# Patient Record
Sex: MALE | Race: BLACK OR AFRICAN AMERICAN | NOT HISPANIC OR LATINO | Employment: UNEMPLOYED | URBAN - METROPOLITAN AREA
[De-identification: names, ages, dates, MRNs, and addresses within clinical notes are randomized per-mention and may not be internally consistent; named-entity substitution may affect disease eponyms.]

---

## 2023-12-16 ENCOUNTER — OFFICE VISIT (OUTPATIENT)
Dept: OTOLARYNGOLOGY | Facility: CLINIC | Age: 53
End: 2023-12-16
Payer: COMMERCIAL

## 2023-12-16 VITALS — WEIGHT: 215 LBS | BODY MASS INDEX: 31.84 KG/M2 | HEIGHT: 69 IN

## 2023-12-16 DIAGNOSIS — R49.0 CHRONIC HOARSENESS: ICD-10-CM

## 2023-12-16 DIAGNOSIS — J37.0 CHRONIC LARYNGITIS: Primary | ICD-10-CM

## 2023-12-16 DIAGNOSIS — J30.1 NON-SEASONAL ALLERGIC RHINITIS DUE TO POLLEN: ICD-10-CM

## 2023-12-16 DIAGNOSIS — K21.9 GASTROESOPHAGEAL REFLUX DISEASE WITHOUT ESOPHAGITIS: ICD-10-CM

## 2023-12-16 DIAGNOSIS — J34.2 DEVIATED NASAL SEPTUM: ICD-10-CM

## 2023-12-16 DIAGNOSIS — G47.33 OBSTRUCTIVE SLEEP APNEA: ICD-10-CM

## 2023-12-16 DIAGNOSIS — J34.89 NASAL OBSTRUCTION: ICD-10-CM

## 2023-12-16 DIAGNOSIS — J34.3 HYPERTROPHY OF NASAL TURBINATES: ICD-10-CM

## 2023-12-16 PROCEDURE — 1036F TOBACCO NON-USER: CPT | Performed by: OTOLARYNGOLOGY

## 2023-12-16 PROCEDURE — 31575 DIAGNOSTIC LARYNGOSCOPY: CPT | Performed by: OTOLARYNGOLOGY

## 2023-12-16 PROCEDURE — 99204 OFFICE O/P NEW MOD 45 MIN: CPT | Performed by: OTOLARYNGOLOGY

## 2023-12-16 RX ORDER — FLUTICASONE PROPIONATE 50 MCG
2 SPRAY, SUSPENSION (ML) NASAL DAILY
Qty: 16 ML | Refills: 3 | Status: SHIPPED | OUTPATIENT
Start: 2023-12-16

## 2023-12-16 RX ORDER — OMEPRAZOLE 40 MG/1
40 CAPSULE, DELAYED RELEASE ORAL DAILY
Qty: 30 CAPSULE | Refills: 3 | Status: SHIPPED | OUTPATIENT
Start: 2023-12-16

## 2023-12-16 RX ORDER — AZELASTINE 1 MG/ML
2 SPRAY, METERED NASAL 2 TIMES DAILY
Qty: 30 ML | Refills: 3 | Status: SHIPPED | OUTPATIENT
Start: 2023-12-16

## 2023-12-16 ASSESSMENT — ENCOUNTER SYMPTOMS
TROUBLE SWALLOWING: 1
SHORTNESS OF BREATH: 1
WHEEZING: 1

## 2023-12-16 NOTE — PROGRESS NOTES
Subjective   Patient ID: Gabriel Umaña is a 53 y.o. male  HPI  Patient is complaining of a chronic history of nasal congestion with rhinorrhea and postnasal drainage.  He also complains of chronic mild hoarseness and frequent clearing of the throat.  He has no hemoptysis or dysphagia.  He has no purulence from his nose and no facial pain.  He does complain of a chronic history of daytime somnolence and fatigue and has been noted to have apnea episodes during his sleep.  Review of Systems   HENT:  Positive for congestion, postnasal drip and trouble swallowing.    Respiratory:  Positive for shortness of breath and wheezing.        Objective   Physical Exam  The following elements of a brief ear nose and throat exam were performed: External ear canals and tympanic membranes, external nose and nasal passages, oral cavity, palpation of the neck, percussion of the face, palpation of the thyroid.    There is nasal edema and the turbinates are pale.  There is a deviation of the septum with some enlargement of the inferior turbinates.  There is mild hoarseness noted.  Indirect mirror laryngoscopy is not possible due to a strong gag reflex.  The remainder of his exam was within normal limits.  Fiberoptic laryngoscopy was offered in light of the chronic hoarseness.  The nasal cavity and nasopharynx and hypopharynx were noted to be clear.  There was good vocal cord motion bilaterally with mild posterior interarytenoid edema.    Laryngoscopy    Date/Time: 12/16/2023 11:41 AM    Performed by: Chris Walker MD  Authorized by: Chris Walker MD    Consent:     Consent obtained:  Verbal    Risks discussed:  Pain and infection  Procedure details:     Indications: hoarseness, dysphagia, or aspiration      Meds administered: Lidocaine and Afrin nasal sprays.    Instrument: flexible fiberoptic laryngoscope      Scope location: bilateral nare        Assessment/Plan   Diagnoses and all orders for this visit:  Chronic laryngitis  (Primary)  Chronic hoarseness  -     Laryngoscopy  Gastroesophageal reflux disease without esophagitis  -     omeprazole (PriLOSEC) 40 mg DR capsule; Take 1 capsule (40 mg) by mouth once daily. Do not crush or chew.  Non-seasonal allergic rhinitis due to pollen  -     fluticasone (Flonase) 50 mcg/actuation nasal spray; Administer 2 sprays into each nostril once daily. Shake gently. Before first use, prime pump. After use, clean tip and replace cap.  -     azelastine (Astelin) 137 mcg (0.1 %) nasal spray; Administer 2 sprays into each nostril 2 times a day.  Deviated nasal septum  Hypertrophy of nasal turbinates  Nasal obstruction  Obstructive sleep apnea  -     Home sleep apnea test (HSAT)     1.  Chronic gastroesophageal reflux with chronic laryngitis leading to hoarseness.  The patient was started on omeprazole at 40 mg/day and he was advised to follow acid reflux precautions.  2.  Chronic allergic rhinitis with deviation of the septum and hypertrophy of the inferior turbinates leading to chronic nasal congestion.  The patient was started on Flonase and Astelin nasal sprays.  3.  Chronic symptoms of obstructive sleep apnea.  The patient was scheduled for sleep study.